# Patient Record
Sex: MALE | Race: BLACK OR AFRICAN AMERICAN | NOT HISPANIC OR LATINO | Employment: FULL TIME | URBAN - METROPOLITAN AREA
[De-identification: names, ages, dates, MRNs, and addresses within clinical notes are randomized per-mention and may not be internally consistent; named-entity substitution may affect disease eponyms.]

---

## 2019-09-23 ENCOUNTER — HOSPITAL ENCOUNTER (EMERGENCY)
Facility: HOSPITAL | Age: 24
Discharge: HOME | End: 2019-09-23
Attending: EMERGENCY MEDICINE | Admitting: EMERGENCY MEDICINE

## 2019-09-23 ENCOUNTER — APPOINTMENT (EMERGENCY)
Dept: CT IMAGING | Facility: HOSPITAL | Age: 24
End: 2019-09-23

## 2019-09-23 ENCOUNTER — APPOINTMENT (EMERGENCY)
Dept: RADIOLOGY | Facility: HOSPITAL | Age: 24
End: 2019-09-23

## 2019-09-23 VITALS
HEART RATE: 69 BPM | WEIGHT: 210.32 LBS | RESPIRATION RATE: 16 BRPM | HEIGHT: 70 IN | OXYGEN SATURATION: 96 % | TEMPERATURE: 98.2 F | DIASTOLIC BLOOD PRESSURE: 83 MMHG | SYSTOLIC BLOOD PRESSURE: 130 MMHG | BODY MASS INDEX: 30.11 KG/M2

## 2019-09-23 DIAGNOSIS — S00.83XA CONTUSION OF JAW, INITIAL ENCOUNTER: ICD-10-CM

## 2019-09-23 DIAGNOSIS — S09.90XA CLOSED HEAD INJURY, INITIAL ENCOUNTER: ICD-10-CM

## 2019-09-23 DIAGNOSIS — S06.0X1A CONCUSSION WITH LOSS OF CONSCIOUSNESS OF 30 MINUTES OR LESS, INITIAL ENCOUNTER: Primary | ICD-10-CM

## 2019-09-23 DIAGNOSIS — S63.681A OTHER SPRAIN OF RIGHT THUMB, INITIAL ENCOUNTER: ICD-10-CM

## 2019-09-23 PROCEDURE — 70110 X-RAY EXAM OF JAW 4/> VIEWS: CPT

## 2019-09-23 PROCEDURE — 73140 X-RAY EXAM OF FINGER(S): CPT

## 2019-09-23 PROCEDURE — 99284 EMERGENCY DEPT VISIT MOD MDM: CPT | Performed by: EMERGENCY MEDICINE

## 2019-09-23 PROCEDURE — 70450 CT HEAD/BRAIN W/O DYE: CPT

## 2019-09-23 PROCEDURE — 99284 EMERGENCY DEPT VISIT MOD MDM: CPT
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
## 2020-01-30 ENCOUNTER — HOSPITAL ENCOUNTER (EMERGENCY)
Facility: HOSPITAL | Age: 25
Discharge: HOME | End: 2020-01-30
Attending: EMERGENCY MEDICINE | Admitting: EMERGENCY MEDICINE
Payer: COMMERCIAL

## 2020-01-30 ENCOUNTER — APPOINTMENT (EMERGENCY)
Dept: CT IMAGING | Facility: HOSPITAL | Age: 25
End: 2020-01-30
Payer: COMMERCIAL

## 2020-01-30 ENCOUNTER — APPOINTMENT (EMERGENCY)
Dept: RADIOLOGY | Facility: HOSPITAL | Age: 25
End: 2020-01-30
Payer: COMMERCIAL

## 2020-01-30 VITALS
TEMPERATURE: 98.4 F | BODY MASS INDEX: 35.05 KG/M2 | RESPIRATION RATE: 18 BRPM | SYSTOLIC BLOOD PRESSURE: 144 MMHG | OXYGEN SATURATION: 99 % | DIASTOLIC BLOOD PRESSURE: 78 MMHG | WEIGHT: 244.27 LBS | HEART RATE: 79 BPM

## 2020-01-30 DIAGNOSIS — M54.50 LOW BACK PAIN: ICD-10-CM

## 2020-01-30 DIAGNOSIS — M79.602 LEFT ARM PAIN: ICD-10-CM
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
## 2020-01-30 DIAGNOSIS — S16.1XXA STRAIN OF NECK MUSCLE, INITIAL ENCOUNTER: ICD-10-CM

## 2020-01-30 PROCEDURE — 99284 EMERGENCY DEPT VISIT MOD MDM: CPT | Performed by: PHYSICIAN ASSISTANT

## 2020-01-30 PROCEDURE — 73090 X-RAY EXAM OF FOREARM: CPT

## 2020-01-30 PROCEDURE — 99284 EMERGENCY DEPT VISIT MOD MDM: CPT

## 2020-01-30 PROCEDURE — 72125 CT NECK SPINE W/O DYE: CPT

## 2020-01-30 PROCEDURE — 72131 CT LUMBAR SPINE W/O DYE: CPT

## 2020-01-30 RX ORDER — IBUPROFEN 600 MG/1
600 TABLET ORAL EVERY 6 HOURS PRN
Qty: 30 TABLET | Refills: 0 | Status: SHIPPED | OUTPATIENT
Start: 2020-01-30

## 2020-01-30 RX ORDER — METHOCARBAMOL 500 MG/1
500 TABLET, FILM COATED ORAL 2 TIMES DAILY
Qty: 20 TABLET | Refills: 0 | Status: SHIPPED | OUTPATIENT
Start: 2020-01-30
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
## 2020-09-07 ENCOUNTER — APPOINTMENT (EMERGENCY)
Dept: RADIOLOGY | Facility: HOSPITAL | Age: 25
End: 2020-09-07
Payer: COMMERCIAL

## 2020-09-07 ENCOUNTER — HOSPITAL ENCOUNTER (EMERGENCY)
Facility: HOSPITAL | Age: 25
Discharge: HOME | End: 2020-09-08
Attending: SURGERY | Admitting: SURGERY
Payer: COMMERCIAL

## 2020-09-07 DIAGNOSIS — L24.A9 WOUND DRAINAGE: Primary | ICD-10-CM

## 2020-09-07 LAB
ABO GROUP BLD: NORMAL
ANION GAP SERPL CALCULATED.3IONS-SCNC: 4 MMOL/L (ref 4–13)
BASE EXCESS BLDA CALC-SCNC: 1 MMOL/L (ref -2–3)
BASOPHILS # BLD AUTO: 0.04 THOUSANDS/ΜL (ref 0–0.1)
BASOPHILS NFR BLD AUTO: 1 % (ref 0–1)
BLD GP AB SCN SERPL QL: NEGATIVE
BUN SERPL-MCNC: 15 MG/DL (ref 5–25)
CA-I BLD-SCNC: 1.24 MMOL/L (ref 1.12–1.32)
CALCIUM SERPL-MCNC: 9 MG/DL (ref 8.3–10.1)
CHLORIDE SERPL-SCNC: 110 MMOL/L (ref 100–108)
CO2 SERPL-SCNC: 28 MMOL/L (ref 21–32)
CREAT SERPL-MCNC: 1.04 MG/DL (ref 0.6–1.3)
EOSINOPHIL # BLD AUTO: 0.23 THOUSAND/ΜL (ref 0–0.61)
EOSINOPHIL NFR BLD AUTO: 3 % (ref 0–6)
ERYTHROCYTE [DISTWIDTH] IN BLOOD BY AUTOMATED COUNT: 12.6 % (ref 11.6–15.1)
GFR SERPL CREATININE-BSD FRML MDRD: 101 ML/MIN/1.73SQ M
GLUCOSE SERPL-MCNC: 86 MG/DL (ref 65–140)
GLUCOSE SERPL-MCNC: 89 MG/DL (ref 65–140)
HCO3 BLDA-SCNC: 26.7 MMOL/L (ref 24–30)
HCT VFR BLD AUTO: 44.2 % (ref 36.5–49.3)
HCT VFR BLD CALC: 44 % (ref 36.5–49.3)
HGB BLD-MCNC: 15.9 G/DL (ref 12–17)
HGB BLDA-MCNC: 15 G/DL (ref 12–17)
IMM GRANULOCYTES # BLD AUTO: 0.02 THOUSAND/UL (ref 0–0.2)
IMM GRANULOCYTES NFR BLD AUTO: 0 % (ref 0–2)
LYMPHOCYTES # BLD AUTO: 2.07 THOUSANDS/ΜL (ref 0.6–4.47)
LYMPHOCYTES NFR BLD AUTO: 31 % (ref 14–44)
MCH RBC QN AUTO: 32.9 PG (ref 26.8–34.3)
MCHC RBC AUTO-ENTMCNC: 36 G/DL (ref 31.4–37.4)
MCV RBC AUTO: 92 FL (ref 82–98)
MONOCYTES # BLD AUTO: 0.73 THOUSAND/ΜL (ref 0.17–1.22)
MONOCYTES NFR BLD AUTO: 11 % (ref 4–12)
NEUTROPHILS # BLD AUTO: 3.7 THOUSANDS/ΜL (ref 1.85–7.62)
NEUTS SEG NFR BLD AUTO: 54 % (ref 43–75)
NRBC BLD AUTO-RTO: 0 /100 WBCS
PCO2 BLD: 28 MMOL/L (ref 21–32)
PCO2 BLD: 46.1 MM HG (ref 42–50)
PH BLD: 7.37 [PH] (ref 7.3–7.4)
PLATELET # BLD AUTO: 283 THOUSANDS/UL (ref 149–390)
PMV BLD AUTO: 9 FL (ref 8.9–12.7)
PO2 BLD: 35 MM HG (ref 35–45)
POTASSIUM BLD-SCNC: 3.7 MMOL/L (ref 3.5–5.3)
POTASSIUM SERPL-SCNC: 3.6 MMOL/L (ref 3.5–5.3)
RBC # BLD AUTO: 4.83 MILLION/UL (ref 3.88–5.62)
RH BLD: NEGATIVE
SAO2 % BLD FROM PO2: 64 % (ref 60–85)
SODIUM BLD-SCNC: 142 MMOL/L (ref 136–145)
SODIUM SERPL-SCNC: 142 MMOL/L (ref 136–145)
SPECIMEN EXPIRATION DATE: NORMAL
SPECIMEN SOURCE: NORMAL
WBC # BLD AUTO: 6.79 THOUSAND/UL (ref 4.31–10.16)

## 2020-09-07 PROCEDURE — 86901 BLOOD TYPING SEROLOGIC RH(D): CPT | Performed by: SURGERY

## 2020-09-07 PROCEDURE — 90471 IMMUNIZATION ADMIN: CPT

## 2020-09-07 PROCEDURE — 36415 COLL VENOUS BLD VENIPUNCTURE: CPT | Performed by: SURGERY

## 2020-09-07 PROCEDURE — 70450 CT HEAD/BRAIN W/O DYE: CPT

## 2020-09-07 PROCEDURE — 96374 THER/PROPH/DIAG INJ IV PUSH: CPT

## 2020-09-07 PROCEDURE — 82330 ASSAY OF CALCIUM: CPT

## 2020-09-07 PROCEDURE — 82947 ASSAY GLUCOSE BLOOD QUANT: CPT

## 2020-09-07 PROCEDURE — 73590 X-RAY EXAM OF LOWER LEG: CPT

## 2020-09-07 PROCEDURE — 82803 BLOOD GASES ANY COMBINATION: CPT

## 2020-09-07 PROCEDURE — 99285 EMERGENCY DEPT VISIT HI MDM: CPT

## 2020-09-07 PROCEDURE — 80048 BASIC METABOLIC PNL TOTAL CA: CPT | Performed by: SURGERY

## 2020-09-07 PROCEDURE — 84295 ASSAY OF SERUM SODIUM: CPT

## 2020-09-07 PROCEDURE — 99282 EMERGENCY DEPT VISIT SF MDM: CPT | Performed by: SURGERY

## 2020-09-07 PROCEDURE — 90715 TDAP VACCINE 7 YRS/> IM: CPT | Performed by: SURGERY

## 2020-09-07 PROCEDURE — 86900 BLOOD TYPING SEROLOGIC ABO: CPT | Performed by: SURGERY

## 2020-09-07 PROCEDURE — 72125 CT NECK SPINE W/O DYE: CPT

## 2020-09-07 PROCEDURE — 74177 CT ABD & PELVIS W/CONTRAST: CPT

## 2020-09-07 PROCEDURE — 86850 RBC ANTIBODY SCREEN: CPT | Performed by: SURGERY

## 2020-09-07 PROCEDURE — NC001 PR NO CHARGE: Performed by: EMERGENCY MEDICINE

## 2020-09-07 PROCEDURE — 85014 HEMATOCRIT: CPT

## 2020-09-07 PROCEDURE — 71260 CT THORAX DX C+: CPT

## 2020-09-07 PROCEDURE — 84132 ASSAY OF SERUM POTASSIUM: CPT

## 2020-09-07 PROCEDURE — 85025 COMPLETE CBC W/AUTO DIFF WBC: CPT | Performed by: SURGERY

## 2020-09-07 PROCEDURE — 73630 X-RAY EXAM OF FOOT: CPT

## 2020-09-07 RX ORDER — HYDROMORPHONE HCL/PF 1 MG/ML
1 SYRINGE (ML) INJECTION ONCE
Status: COMPLETED | OUTPATIENT
Start: 2020-09-07 | End: 2020-09-07

## 2020-09-07 RX ORDER — HYDROMORPHONE HCL/PF 1 MG/ML
0.5 SYRINGE (ML) INJECTION EVERY 2 HOUR PRN
Status: DISCONTINUED | OUTPATIENT
Start: 2020-09-07 | End: 2020-09-08 | Stop reason: HOSPADM

## 2020-09-07 RX ADMIN — IOHEXOL 100 ML: 350 INJECTION, SOLUTION INTRAVENOUS at 20:56

## 2020-09-07 RX ADMIN — HYDROMORPHONE HYDROCHLORIDE 1 MG: 1 INJECTION, SOLUTION INTRAMUSCULAR; INTRAVENOUS; SUBCUTANEOUS at 23:24

## 2020-09-07 RX ADMIN — TETANUS TOXOID, REDUCED DIPHTHERIA TOXOID AND ACELLULAR PERTUSSIS VACCINE, ADSORBED 0.5 ML: 5; 2.5; 8; 8; 2.5 SUSPENSION INTRAMUSCULAR at 20:30

## 2020-09-07 RX ADMIN — HYDROMORPHONE HYDROCHLORIDE 0.5 MG: 1 INJECTION, SOLUTION INTRAMUSCULAR; INTRAVENOUS; SUBCUTANEOUS at 21:31

## 2020-09-08 VITALS
RESPIRATION RATE: 18 BRPM | HEART RATE: 58 BPM | TEMPERATURE: 99.1 F | SYSTOLIC BLOOD PRESSURE: 142 MMHG | OXYGEN SATURATION: 98 % | DIASTOLIC BLOOD PRESSURE: 93 MMHG

## 2020-09-08 NOTE — QUICK NOTE
Patient was able to move his neck with full range of motion and without pain  He did not have tenderness over midline of neck  Cspine was cleared and C collar removed
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
## 2020-09-08 NOTE — ED NOTES
Family asking about discharge instructions and requesting to speak with doctor at this time  Kamaljit Weaver at this time and aware family would like to speak with her prior to discharge       Suzanne Anne RN  09/08/20 0625

## 2020-09-08 NOTE — ED NOTES
Several attempts made to contact Dr Francisco Humphrey to speak to patient prior to discharge and answer family's questions via [facility] Text  At this time, patient requesting to leave ED  RN provided discharge teaching and discussed discharge instructions with the patient and significant other at this time  Patient verbalized understanding        Fatoumata Pak RN  09/08/20 0568

## 2020-09-08 NOTE — ED NOTES
Murtaza Text send to Dr Phillips Goes at this time regarding patient discharge at this time       Simi Bhat RN  09/08/20 7268

## 2020-09-08 NOTE — DISCHARGE INSTRUCTIONS
Remove old dressings and clean wounds with sterile saline  Clean wounds and dry and cover with adaptic and wrap with Kerlix wrap  Change daily and PRN
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
## 2020-09-08 NOTE — INCIDENTAL FINDINGS
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

## 2020-09-08 NOTE — QUICK NOTE
Discussed wound care and follow up instructions with patient and family before they left the ER and ordered crutches for the patient to take home on discharge  Due to non availability of  in ER overnight, will discuss with  in am regarding follow up for wound care and dressing care of left lateral shin wound
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
## 2020-09-08 NOTE — ED NOTES
Dr Maria Victoria Brunner responded to [facility] Text at this time  Will be down to speak to patient prior to patient being discharged and currently on her way        Chrissie Joya RN  09/08/20 0510

## 2021-03-15 ENCOUNTER — HOSPITAL ENCOUNTER (EMERGENCY)
Facility: HOSPITAL | Age: 26
Discharge: HOME | End: 2021-03-15

## 2021-03-15 ENCOUNTER — APPOINTMENT (EMERGENCY)
Dept: RADIOLOGY | Facility: HOSPITAL | Age: 26
End: 2021-03-15

## 2021-03-15 VITALS
WEIGHT: 275.57 LBS | DIASTOLIC BLOOD PRESSURE: 78 MMHG | TEMPERATURE: 98.3 F | SYSTOLIC BLOOD PRESSURE: 130 MMHG | RESPIRATION RATE: 16 BRPM | HEART RATE: 98 BPM | OXYGEN SATURATION: 97 %
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
## 2021-03-15 LAB
ALBUMIN SERPL BCP-MCNC: 4.1 G/DL (ref 3.5–5)
ALP SERPL-CCNC: 70 U/L (ref 46–116)
ALT SERPL W P-5'-P-CCNC: 66 U/L (ref 12–78)
ANION GAP SERPL CALCULATED.3IONS-SCNC: 4 MMOL/L (ref 4–13)
AST SERPL W P-5'-P-CCNC: 29 U/L (ref 5–45)
BASOPHILS # BLD AUTO: 0.03 THOUSANDS/ΜL (ref 0–0.1)
BASOPHILS NFR BLD AUTO: 0 % (ref 0–1)
BILIRUB SERPL-MCNC: 0.46 MG/DL (ref 0.2–1)
BUN SERPL-MCNC: 12 MG/DL (ref 5–25)
CALCIUM SERPL-MCNC: 9.4 MG/DL (ref 8.3–10.1)
CHLORIDE SERPL-SCNC: 108 MMOL/L (ref 100–108)
CO2 SERPL-SCNC: 29 MMOL/L (ref 21–32)
CREAT SERPL-MCNC: 1 MG/DL (ref 0.6–1.3)
EOSINOPHIL # BLD AUTO: 0.14 THOUSAND/ΜL (ref 0–0.61)
EOSINOPHIL NFR BLD AUTO: 2 % (ref 0–6)
ERYTHROCYTE [DISTWIDTH] IN BLOOD BY AUTOMATED COUNT: 12.6 % (ref 11.6–15.1)
GFR SERPL CREATININE-BSD FRML MDRD: 122 ML/MIN/1.73SQ M
GLUCOSE SERPL-MCNC: 85 MG/DL (ref 65–140)
HCT VFR BLD AUTO: 48 % (ref 36.5–49.3)
HGB BLD-MCNC: 16.8 G/DL (ref 12–17)
IMM GRANULOCYTES # BLD AUTO: 0.03 THOUSAND/UL (ref 0–0.2)
IMM GRANULOCYTES NFR BLD AUTO: 0 % (ref 0–2)
LYMPHOCYTES # BLD AUTO: 1.62 THOUSANDS/ΜL (ref 0.6–4.47)
LYMPHOCYTES NFR BLD AUTO: 22 % (ref 14–44)
MCH RBC QN AUTO: 31.8 PG (ref 26.8–34.3)
MCHC RBC AUTO-ENTMCNC: 35 G/DL (ref 31.4–37.4)
MCV RBC AUTO: 91 FL (ref 82–98)
MONOCYTES # BLD AUTO: 0.75 THOUSAND/ΜL (ref 0.17–1.22)
MONOCYTES NFR BLD AUTO: 10 % (ref 4–12)
NEUTROPHILS # BLD AUTO: 4.92 THOUSANDS/ΜL (ref 1.85–7.62)
NEUTS SEG NFR BLD AUTO: 66 % (ref 43–75)
NRBC BLD AUTO-RTO: 0 /100 WBCS
PLATELET # BLD AUTO: 311 THOUSANDS/UL (ref 149–390)
PMV BLD AUTO: 9 FL (ref 8.9–12.7)
POTASSIUM SERPL-SCNC: 3.8 MMOL/L (ref 3.5–5.3)
PROT SERPL-MCNC: 7.9 G/DL (ref 6.4–8.2)
RBC # BLD AUTO: 5.28 MILLION/UL (ref 3.88–5.62)
SODIUM SERPL-SCNC: 141 MMOL/L (ref 136–145)
WBC # BLD AUTO: 7.49 THOUSAND/UL (ref 4.31–10.16)

## 2021-03-15 PROCEDURE — 82803 BLOOD GASES ANY COMBINATION: CPT

## 2021-03-15 PROCEDURE — NC001 PR NO CHARGE: Performed by: SURGERY

## 2021-03-15 PROCEDURE — 70450 CT HEAD/BRAIN W/O DYE: CPT

## 2021-03-15 PROCEDURE — 85014 HEMATOCRIT: CPT

## 2021-03-15 PROCEDURE — 93308 TTE F-UP OR LMTD: CPT | Performed by: SURGERY

## 2021-03-15 PROCEDURE — NC001 PR NO CHARGE: Performed by: EMERGENCY MEDICINE

## 2021-03-15 PROCEDURE — 84132 ASSAY OF SERUM POTASSIUM: CPT

## 2021-03-15 PROCEDURE — 76705 ECHO EXAM OF ABDOMEN: CPT | Performed by: SURGERY

## 2021-03-15 PROCEDURE — 99285 EMERGENCY DEPT VISIT HI MDM: CPT

## 2021-03-15 PROCEDURE — 71260 CT THORAX DX C+: CPT

## 2021-03-15 PROCEDURE — 73590 X-RAY EXAM OF LOWER LEG: CPT

## 2021-03-15 PROCEDURE — 72125 CT NECK SPINE W/O DYE: CPT

## 2021-03-15 PROCEDURE — 82947 ASSAY GLUCOSE BLOOD QUANT: CPT

## 2021-03-15 PROCEDURE — 85025 COMPLETE CBC W/AUTO DIFF WBC: CPT | Performed by: SURGERY

## 2021-03-15 PROCEDURE — 96375 TX/PRO/DX INJ NEW DRUG ADDON: CPT

## 2021-03-15 PROCEDURE — 36415 COLL VENOUS BLD VENIPUNCTURE: CPT | Performed by: SURGERY

## 2021-03-15 PROCEDURE — 12013 RPR F/E/E/N/L/M 2.6-5.0 CM: CPT | Performed by: SURGERY

## 2021-03-15 PROCEDURE — 74177 CT ABD & PELVIS W/CONTRAST: CPT

## 2021-03-15 PROCEDURE — 99284 EMERGENCY DEPT VISIT MOD MDM: CPT | Performed by: SURGERY

## 2021-03-15 PROCEDURE — 84295 ASSAY OF SERUM SODIUM: CPT

## 2021-03-15 PROCEDURE — 80053 COMPREHEN METABOLIC PANEL: CPT | Performed by: SURGERY

## 2021-03-15 PROCEDURE — 71045 X-RAY EXAM CHEST 1 VIEW: CPT

## 2021-03-15 PROCEDURE — 96374 THER/PROPH/DIAG INJ IV PUSH: CPT

## 2021-03-15 RX ORDER — ONDANSETRON 2 MG/ML
4 INJECTION INTRAMUSCULAR; INTRAVENOUS ONCE
Status: COMPLETED | OUTPATIENT
Start: 2021-03-15 | End: 2021-03-15

## 2021-03-15 RX ORDER — LIDOCAINE HYDROCHLORIDE 10 MG/ML
10 INJECTION, SOLUTION EPIDURAL; INFILTRATION; INTRACAUDAL; PERINEURAL ONCE
Status: COMPLETED | OUTPATIENT
Start: 2021-03-15 | End: 2021-03-15

## 2021-03-15 RX ORDER — FENTANYL CITRATE 50 UG/ML
25 INJECTION, SOLUTION INTRAMUSCULAR; INTRAVENOUS ONCE
Status: COMPLETED | OUTPATIENT
Start: 2021-03-15 | End: 2021-03-15

## 2021-03-15 RX ADMIN — ONDANSETRON 4 MG: 2 INJECTION INTRAMUSCULAR; INTRAVENOUS at 17:36

## 2021-03-15 RX ADMIN — FENTANYL CITRATE 25 MCG: 50 INJECTION, SOLUTION INTRAMUSCULAR; INTRAVENOUS at 17:36

## 2021-03-15 RX ADMIN — IOHEXOL 100 ML: 350 INJECTION, SOLUTION INTRAVENOUS at 18:12

## 2021-03-15 RX ADMIN — LIDOCAINE HYDROCHLORIDE 10 ML: 10 INJECTION, SOLUTION EPIDURAL; INFILTRATION; INTRACAUDAL; PERINEURAL at 18:45

## 2021-03-15 NOTE — PROCEDURES
Laceration repair    Date/Time: 3/15/2021 7:45 PM  Performed by: Lisa Dixon MD  Authorized by: Lisa Dixon MD   Consent: Verbal consent obtained    Consent given by: patient  Body area: head/neck  Location details: forehead  Laceration length: 3 cm  Foreign bodies: no foreign bodies  Anesthesia: local infiltration    Anesthesia:  Local Anesthetic: lidocaine 1% without epinephrine  Anesthetic total: 10 mL      Procedure Details:  Irrigation solution: saline  Amount of cleaning: standard  Debridement: none  Degree of undermining: none  Skin closure: 4-0 Prolene  Number of sutures: 4  Technique: simple  Approximation: close  Approximation difficulty: simple  Dressing: 4x4 sterile gauze and gauze roll  Patient tolerance: patient tolerated the procedure well with no immediate complications
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
## 2021-03-15 NOTE — H&P
H&P Exam - Trauma   Tia Bah 23 y o  male MRN: [id number]  Unit/Bed#: TR 01 Encounter: [id number]    Assessment/Plan   Trauma Alert: {Trauma Alert:22120}  Model of Arrival: {Mode of Arrival:22121}  Trauma Team: {Team:22122}  Consultants: {[facility] Trauma Consultants:67755}    Trauma Active Problems: ***    Trauma Plan: ***    Chief Complaint: ***    History of Present Illness   HPI:  Tia Bah is a 23 y o  male who presents with ***  Mechanism:{Mechanism of injury:33011}    Review of Systems    12-point, complete review of systems was reviewed and negative except as stated above  Historical Information   History is unobtainable from the patient due to ***  Efforts to obtain history included the following sources: {Reason history is unobtainable:22201}    History reviewed  No pertinent past medical history  History reviewed  No pertinent surgical history  Social History   Social History     Substance and Sexual Activity   Alcohol Use Never    Frequency: Never     Social History     Substance and Sexual Activity   Drug Use Yes    Types: Marijuana     Social History     Tobacco Use   Smoking Status Never Smoker   Smokeless Tobacco Never Used     E-Cigarette/Vaping    E-Cigarette Use Never User      E-Cigarette/Vaping Substances       There is no immunization history on file for this patient    Last Tetanus: ***  Family History: Non-contributory  Unable to obtain/limited by ***      Meds/Allergies   {[facility] PNKA:461572026}    No Known Allergies      PHYSICAL EXAM    PE limited by: ***    Objective   Vitals:   First set: Temperature: 98 3 °F (36 8 °C) (03/15/21 1730)  Pulse: 83 (03/15/21 1730)  Respirations: 18 (03/15/21 1730)  Blood Pressure: 134/91 (03/15/21 1730)    Primary Survey:   (A) Airway: ***  (B) Breathing: ***  (C) Circulation: Pulses:   {Pulses:17215}  (D) Disabliity:  {GCS response:10954}  (E) Expose:  {Completed:26758}    Secondary Survey: (Click on Physical Exam tab above)  Physical Exam    Invasive Devices     Peripheral Intravenous Line            Peripheral IV 03/15/21 Left Antecubital less than 1 day    Peripheral IV 03/15/21 Left Hand less than 1 day                Lab Results: {Laboratory NJZL:30089}  Imaging/EKG Studies: {Radiographic KVGL:54138}  Other Studies: ***    Code Status: No Order  Advance Directive and Living Will:      Power of :    POLST:
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
## 2021-03-16 LAB
BASE EXCESS BLDA CALC-SCNC: 1 MMOL/L (ref -2–3)
GLUCOSE SERPL-MCNC: 86 MG/DL (ref 65–140)
HCO3 BLDA-SCNC: 26.6 MMOL/L (ref 24–30)
HCT VFR BLD CALC: 48 % (ref 36.5–49.3)
HGB BLDA-MCNC: 16.3 G/DL (ref 12–17)
PCO2 BLD: 28 MMOL/L (ref 21–32)
PCO2 BLD: 43.3 MM HG (ref 42–50)
PH BLD: 7.4 [PH] (ref 7.3–7.4)
PO2 BLD: 26 MM HG (ref 35–45)
POTASSIUM BLD-SCNC: 3.9 MMOL/L (ref 3.5–5.3)
SAO2 % BLD FROM PO2: 47 % (ref 60–85)
SODIUM BLD-SCNC: 142 MMOL/L (ref 136–145)
SPECIMEN SOURCE: ABNORMAL
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
## 2021-08-19 ENCOUNTER — HOSPITAL ENCOUNTER (EMERGENCY)
Facility: HOSPITAL | Age: 26
Discharge: HOME | End: 2021-08-19
Attending: EMERGENCY MEDICINE | Admitting: EMERGENCY MEDICINE
Payer: COMMERCIAL

## 2021-08-19 VITALS
TEMPERATURE: 97.9 F | OXYGEN SATURATION: 95 % | SYSTOLIC BLOOD PRESSURE: 134 MMHG | WEIGHT: 275.57 LBS | BODY MASS INDEX: 39.45 KG/M2 | HEART RATE: 67 BPM | DIASTOLIC BLOOD PRESSURE: 86 MMHG | HEIGHT: 70 IN | RESPIRATION RATE: 17 BRPM

## 2021-08-19 DIAGNOSIS — A69.1 ANUG (ACUTE NECROTIZING ULCERATIVE GINGIVITIS): Primary | ICD-10-CM

## 2021-08-19 DIAGNOSIS — K08.89 DENTALGIA: ICD-10-CM

## 2021-08-19 DIAGNOSIS — K08.89 PAIN, DENTAL: ICD-10-CM

## 2021-08-19 DIAGNOSIS — K08.89 TOOTH PAIN: ICD-10-CM

## 2021-08-19 PROCEDURE — 99283 EMERGENCY DEPT VISIT LOW MDM: CPT

## 2021-08-19 PROCEDURE — 99284 EMERGENCY DEPT VISIT MOD MDM: CPT | Performed by: EMERGENCY MEDICINE

## 2021-08-19 PROCEDURE — 96372 THER/PROPH/DIAG INJ SC/IM: CPT

## 2021-08-19 RX ORDER — KETOROLAC TROMETHAMINE 30 MG/ML
15 INJECTION, SOLUTION INTRAMUSCULAR; INTRAVENOUS ONCE
Status: COMPLETED | OUTPATIENT
Start: 2021-08-19 | End: 2021-08-19

## 2021-08-19 RX ORDER — AMOXICILLIN AND CLAVULANATE POTASSIUM 875; 125 MG/1; MG/1
1 TABLET, FILM COATED ORAL EVERY 12 HOURS
Qty: 14 TABLET | Refills: 0 | Status: SHIPPED | OUTPATIENT
Start: 2021-08-19 | End: 2021-08-26

## 2021-08-19 RX ORDER — NAPROXEN 500 MG/1
500 TABLET ORAL 2 TIMES DAILY WITH MEALS
Qty: 14 TABLET | Refills: 0 | Status: SHIPPED | OUTPATIENT
Start: 2021-08-19 | End: 2021-08-26

## 2021-08-19 RX ORDER — CHLORHEXIDINE GLUCONATE 0.12 MG/ML
15 RINSE ORAL 2 TIMES DAILY
Qty: 120 ML | Refills: 0 | Status: SHIPPED | OUTPATIENT
Start: 2021-08-19

## 2021-08-19 RX ORDER — LIDOCAINE HYDROCHLORIDE AND EPINEPHRINE BITARTRATE 20; .01 MG/ML; MG/ML
1 INJECTION, SOLUTION SUBCUTANEOUS ONCE
Status: COMPLETED | OUTPATIENT
Start: 2021-08-19 | End: 2021-08-19

## 2021-08-19 RX ORDER — METRONIDAZOLE 500 MG/1
500 TABLET ORAL 3 TIMES DAILY
Qty: 21 TABLET | Refills: 0 | Status: SHIPPED | OUTPATIENT
Start: 2021-08-19 | End: 2021-08-26

## 2021-08-19 RX ADMIN — KETOROLAC TROMETHAMINE 15 MG: 30 INJECTION, SOLUTION INTRAMUSCULAR; INTRAVENOUS at 20:36

## 2021-08-19 RX ADMIN — LIDOCAINE HYDROCHLORIDE AND EPINEPHRINE 1 ML: 20; 10 INJECTION, SOLUTION INFILTRATION; PERINEURAL at 20:37

## 2021-08-19 NOTE — Clinical Note
Royce Bell was seen and treated in our emergency department on 8/19/2021  Diagnosis:     Don    He may return on this date: 08/19/2021    Patient was evaluated in the ED 8/19/2021     If you have any questions or concerns, please don't hesitate to call        Joe Linton, DO    ______________________________           _______________          _______________  Hospital Representative                              Date                                Time

## 2021-08-19 NOTE — Clinical Note
Kendrick Gallo was seen and treated in our emergency department on 8/19/2021  Diagnosis:     Don    He may return on this date: 08/19/2021    Patient was evaluated in the ED 8/19/2021     If you have any questions or concerns, please don't hesitate to call        J Carlos Romeo DO    ______________________________           _______________          _______________  Hospital Representative                              Date                                Time

## 2021-08-19 NOTE — Clinical Note
Severiano Cespedes was seen and treated in our emergency department on 8/19/2021  Diagnosis:     Don    He may return on this date: 08/19/2021    Patient was evaluated in the ED 8/19/2021     If you have any questions or concerns, please don't hesitate to call        Vielka Mullen DO    ______________________________           _______________          _______________  Hospital Representative                              Date                                Time

## 2021-08-19 NOTE — Clinical Note
Jaqui Maria was seen and treated in our emergency department on 8/19/2021  Diagnosis:     Don    He may return on this date: 08/19/2021    Patient was evaluated in the ED 8/19/2021     If you have any questions or concerns, please don't hesitate to call        Gwendolyn Martinez DO    ______________________________           _______________          _______________  Hospital Representative                              Date                                Time

## 2021-08-19 NOTE — Clinical Note
Dominga Gant was seen and treated in our emergency department on 8/19/2021  Diagnosis:     Don    He may return on this date: 08/19/2021    Patient was evaluated in the ED 8/19/2021     If you have any questions or concerns, please don't hesitate to call        Chante Hicks,     ______________________________           _______________          _______________  Hospital Representative                              Date                                Time

## 2021-08-20 NOTE — ED PROVIDER NOTES
History  Chief Complaint   Patient presents with    Dental Pain     Patient reports left lower gum pain for past 3 days  Patient is a 65-year-old male presenting to the emergency department for dentalgia  Patient states that for the past 3 days his left lower tooth has been irritated  He has been trying ice for pain relief as well as keeping the area clean  Swishing and spitting with salt water as well as peroxide without any relief of his symptoms  He currently rates his pain a 10/10  He has not tried any over-the-counter medications for his pain as he does not like to use medications unless entirely necessary  Nose patient denies any issues swallowing but does state that it hurts when he chews on his left side  He denies any ear pain, fevers, chills, nausea, vomiting, diarrhea, constipation, abdominal pain, headaches, sinus pressure, chest pain, shortness of breath, sore throat, rhinorrhea, ear pain  Prior to Admission Medications   Prescriptions Last Dose Informant Patient Reported? Taking?   ibuprofen (MOTRIN) 600 mg tablet   No No   Sig: Take 1 tablet (600 mg total) by mouth every 6 (six) hours as needed for moderate pain   methocarbamol (ROBAXIN) 500 mg tablet   No No   Sig: Take 1 tablet (500 mg total) by mouth 2 (two) times a day      Facility-Administered Medications: None       Past Medical History:   Diagnosis Date    ADH disorder ([facility] [address] )        Past Surgical History:   Procedure Laterality Date    ANKLE SURGERY Left        History reviewed  No pertinent family history  I have reviewed and agree with the history as documented      E-Cigarette/Vaping    E-Cigarette Use Never User      E-Cigarette/Vaping Substances     Social History     Tobacco Use    Smoking status: Never Smoker    Smokeless tobacco: Never Used   Vaping Use    Vaping Use: Never used   Substance Use Topics    Alcohol use: Never     Comment: weekly, none today    Drug use: Yes     Types: Marijuana     Comment: ectesy occationally        Review of Systems   Constitutional: Negative for appetite change, chills, fatigue and fever  HENT: Positive for dental problem  Negative for ear pain, facial swelling, hearing loss, postnasal drip, rhinorrhea, sinus pressure, sinus pain, sore throat and trouble swallowing  Eyes: Negative for pain  Respiratory: Negative for chest tightness and shortness of breath  Cardiovascular: Negative for chest pain and palpitations  Gastrointestinal: Negative for abdominal pain, constipation, diarrhea, nausea and vomiting  Musculoskeletal: Negative for arthralgias and neck pain  Skin: Negative for color change  Neurological: Negative for dizziness, weakness and numbness  Psychiatric/Behavioral: Negative for agitation and behavioral problems  All other systems reviewed and are negative  Physical Exam  ED Triage Vitals [08/19/21 1911]   Temperature Pulse Respirations Blood Pressure SpO2   97 9 °F (36 6 °C) 67 17 134/86 95 %      Temp Source Heart Rate Source Patient Position - Orthostatic VS BP Location FiO2 (%)   Tympanic Monitor -- -- --      Pain Score       Worst Possible Pain             Orthostatic Vital Signs  Vitals:    08/19/21 1911   BP: 134/86   Pulse: 67       Physical Exam  Vitals and nursing note reviewed  Constitutional:       General: He is not in acute distress  Appearance: Normal appearance  He is normal weight  HENT:      Head: Normocephalic and atraumatic  Right Ear: External ear normal  There is impacted cerumen  Left Ear: External ear normal  There is impacted cerumen  Nose: Nose normal  No congestion  Mouth/Throat:      Mouth: Mucous membranes are moist       Dentition: Gingival swelling present  Pharynx: Uvula midline  Posterior oropharyngeal erythema present  No oropharyngeal exudate  Comments:       Eyes:      Extraocular Movements: Extraocular movements intact        Conjunctiva/sclera: Conjunctivae normal  Pupils: Pupils are equal, round, and reactive to light  Neck:      Comments: Tender lymphadenopathy in front of left ear  Cardiovascular:      Rate and Rhythm: Normal rate and regular rhythm  Pulses: Normal pulses  Heart sounds: Normal heart sounds  Pulmonary:      Effort: Pulmonary effort is normal       Breath sounds: Normal breath sounds  Abdominal:      General: Bowel sounds are normal       Palpations: Abdomen is soft  Tenderness: There is no abdominal tenderness  There is no guarding  Musculoskeletal:         General: No swelling or tenderness  Normal range of motion  Cervical back: Normal range of motion and neck supple  Right lower leg: No edema  Left lower leg: No edema  Skin:     General: Skin is warm  Capillary Refill: Capillary refill takes 2 to 3 seconds  Coloration: Skin is not jaundiced or pale  Neurological:      General: No focal deficit present  Mental Status: He is alert and oriented to person, place, and time  Psychiatric:         Mood and Affect: Mood normal          Behavior: Behavior normal      Media Information     Document Information    Clinical Image - Mobile Device      08/19/2021 19:55   Attached To: Hospital Encounter on 8/19/21   Source Information    Kwame Quijano,   Be Ed         ED Medications  Medications   ketorolac (TORADOL) injection 15 mg (15 mg Intramuscular Given 8/19/21 2036)   lidocaine-epinephrine (XYLOCAINE/EPINEPHRINE) 2 %-1:100,000 injection 1 mL (1 mL Infiltration Given 8/19/21 2037)       Diagnostic Studies  Results Reviewed     None                 No orders to display         Procedures  Procedures      ED Course  ED Course as of Aug 19 2110   Thu Aug 19, 2021   2035 27yo with 3 days of dental pain   Patient                                 SBIRT 20yo+      Most Recent Value   SBIRT (22 yo +)   In order to provide better care to our patients, we are screening all of our patients for alcohol and drug use  Would it be okay to ask you these screening questions? No Filed at: 08/19/2021 2009                [facility]  Number of Diagnoses or Management Options  ANUG (acute necrotizing ulcerative gingivitis)  Dentalgia  Pain, dental  Tooth pain  Diagnosis management comments: 51-year-old male presenting with 3 day history dental pain on the left side  Patient has tried salt water swish and spit as well as ice the management of his pain  Patient denies any fevers, chills, nausea, vomiting, diarrhea, constipation, difficulty swallowing, ear pain, headaches, sore throat, any upper respiratory tract symptoms  Physical exam was significant for tooth irritation on the left side  Discussed with patient his pain management options which include an IM injection of pain medication as well as a dental block which will wear off in the next 12 hours  Patient is on board and in agreeance for both of these treatments  Gave him a dental block which relieved his symptoms as well as Toradol  Patient likely has ANUG will treat with antibiotics and naproxen palpation  Will give patient follow-up with dental clinic as well as a follow-up ambulatory family medicine practice  Will also provide patient with a work note because he is on house arrest   Patient has no questions at this time and is in agreeance with the treatment plan  Will encourage him to follow up as well as come back to the hospital if he has any new or worsening symptoms        Disposition  Final diagnoses:   Pain, dental   Dentalgia   Tooth pain   ANUG (acute necrotizing ulcerative gingivitis)     Time reflects when diagnosis was documented in both MDM as applicable and the Disposition within this note     Time User Action Codes Description Comment    8/19/2021  8:23 PM Kendy Francisco Add [K08 89] Pain, dental     8/19/2021  8:23 PM Joya Teran [K08 89] [address] [place] [address]     8/19/2021  8:23 PM Kendy Salazarberg Add [K08 89] Tooth pain     8/19/2021  8:33 PM Kendy Francisco Add [A69 1] ANUG (acute necrotizing ulcerative gingivitis)     8/19/2021  8:33 PM Kendy Francisco Modify [K08 89] Pain, dental     8/19/2021  8:33 PM Nicholas Akbar Modify [A69 1] ANUG (acute necrotizing ulcerative gingivitis)       ED Disposition     ED Disposition Condition Date/Time Comment    Discharge Stable Thu Aug 19, 2021  8:23 PM Samson Alexander discharge to home/self care              Follow-up Information     Follow up With Specialties Details Why Contact Info Additional [address] Emergency Department Emergency Medicine Go to  As needed, If symptoms worsen [address]  [address] [facility] [address] Emergency Department, [address] [facility], [place], [place], [address]    [address]  Schedule an appointment as soon as possible for a visit in 1 day for follow up [address]  [facility] [address] [phone]           Patient's Medications   Discharge Prescriptions    AMOXICILLIN-CLAVULANATE (AUGMENTIN) 875-125 MG PER TABLET    Take 1 tablet by mouth every 12 (twelve) hours for 7 days       Start Date: 8/19/2021 End Date: 8/26/2021       Order Dose: 1 tablet       Quantity: 14 tablet    Refills: 0    CHLORHEXIDINE (PERIDEX) 0 12 % SOLUTION    Apply 15 mL to the mouth or throat 2 (two) times a day       Start Date: 8/19/2021 End Date: --       Order Dose: 15 mL       Quantity: 120 mL    Refills: 0    METRONIDAZOLE (FLAGYL) 500 MG TABLET    Take 1 tablet (500 mg total) by mouth 3 (three) times a day for 7 days       Start Date: 8/19/2021 End Date: 8/26/2021       Order Dose: 500 mg       Quantity: 21 tablet    Refills: 0    NAPROXEN (NAPROSYN) 500 MG TABLET    Take 1 tablet (500 mg total) by mouth 2 (two) times a day with meals for 7 days       Start Date: 8/19/2021 End Date: 8/26/2021       Order Dose: 500 mg       Quantity: 14 tablet    Refills: 0 PDMP Review     None           ED Provider  Attending physically available and evaluated Ai Alexandre  JENNIFER managed the patient along with the ED Attending      Electronically Signed by         Derrick Rose DO  08/19/21 3986

## 2021-08-20 NOTE — DISCHARGE INSTRUCTIONS
Thank you for coming to the ER today  We treated your symptoms here and are sending you home with a prescription for antibiotics and medications to manage your pain  Please take them both as prescribed on the bottle  Please follow up with your primary care doctor in 1-2 days to be re-evaluated  Please call the dental clinic for follow up tomorrow  If at any point you experience any new or worsening symptoms do not hesitate to come back to the hospital to be evaluated  Thank you and hope you have a great rest of your day

## 2021-08-20 NOTE — ED ATTENDING ATTESTATION
8/19/2021  Liv Almaraz DO, saw and evaluated the patient  I have discussed the patient with the resident/non-physician practitioner and agree with the resident's/non-physician practitioner's findings, Plan of Care, and MDM as documented in the resident's/non-physician practitioner's note, except where noted  All available labs and Radiology studies were reviewed  I was present for key portions of any procedure(s) performed by the resident/non-physician practitioner and I was immediately available to provide assistance  At this point I agree with the current assessment done in the Emergency Department  I have conducted an independent evaluation of this patient a history and physical is as follows:    82-year-old male presents for evaluation of left lower jaw pain for past few days  He has been using peroxide mouthwash without success  His pain is somewhat worse today  Denies swelling, fever pain is worse shoe and denies difficulty swallowing  Symptoms constant, mild-to-moderate severity no other alleviating or exacerbating factors denies radiation of pain  Uvula midline  Submandibular space soft  Tolerating secretions  No intraoral abscess  Neck supple no meningismus  No stridor  There is severe gingivitis of the left lower gingiva adjacent to 17 , 18 , 19  With mild necrosis of the interdental papillae    Impression:  Acute necrotizing ulcerative gingivitis plan:  Augmentin, Flagyl, chlorhexidine mouth rinse, follow-up with dental clinic            ED Course         Critical Care Time  Procedures